# Patient Record
Sex: MALE | Race: ASIAN | NOT HISPANIC OR LATINO | URBAN - METROPOLITAN AREA
[De-identification: names, ages, dates, MRNs, and addresses within clinical notes are randomized per-mention and may not be internally consistent; named-entity substitution may affect disease eponyms.]

---

## 2017-06-07 PROBLEM — 59621000 ESSENTIAL HYPERTENSION: Status: ACTIVE | Noted: 2017-06-07

## 2017-06-07 PROBLEM — 34713006 VITAMIN D DEFICIENCY: Status: ACTIVE | Noted: 2017-06-07

## 2018-04-20 PROBLEM — 275978004 SCREENING FOR MALIGNANT NEOPLASM OF COLON: Status: ACTIVE | Noted: 2018-04-20

## 2018-05-14 PROBLEM — 92076000 BENIGN NEOPLASM OF DESCENDING COLON: Status: ACTIVE | Noted: 2018-05-08

## 2018-11-20 PROBLEM — 267434003 MIXED HYPERLIPIDEMIA: Status: ACTIVE | Noted: 2017-06-07

## 2019-11-18 PROBLEM — 16331000: Status: ACTIVE | Noted: 2019-05-28

## 2020-10-06 ENCOUNTER — OFFICE VISIT (OUTPATIENT)
Dept: URBAN - METROPOLITAN AREA CLINIC 37 | Facility: CLINIC | Age: 55
End: 2020-10-06

## 2020-10-12 ENCOUNTER — LAB OUTSIDE AN ENCOUNTER (OUTPATIENT)
Dept: URBAN - METROPOLITAN AREA CLINIC 37 | Facility: CLINIC | Age: 55
End: 2020-10-12

## 2020-10-13 ENCOUNTER — LAB OUTSIDE AN ENCOUNTER (OUTPATIENT)
Dept: URBAN - METROPOLITAN AREA CLINIC 37 | Facility: CLINIC | Age: 55
End: 2020-10-13

## 2020-10-17 LAB
AFP-L3: (no result)
AFP: 1.2
ALBUMIN/GLOBULIN RATIO: 1.8
ALBUMIN: 4.6
ALKALINE PHOSPHATASE: 72
ALT: 25
AST: 18
BILIRUBIN, TOTAL: 0.6
BUN/CREATININE RATIO: 11
CALCIUM: 9.4
CARBON DIOXIDE: 28
CHLORIDE: 104
CREATININE: 1.42
EGFR AFRICAN AMERICAN: 65
EGFR NON-AFR. AMERICAN: 56
GLOBULIN: 2.5
GLUCOSE: 106
HEPATITIS B SURFACE: (no result)
HEPATITIS B VIRUS DNA: <1
HEPATITIS B VIRUS DNA: <10
POTASSIUM: 4.5
PROTEIN, TOTAL: 7.1
SODIUM: 143
UREA NITROGEN (BUN): 16

## 2020-10-20 ENCOUNTER — OFFICE VISIT (OUTPATIENT)
Dept: URBAN - METROPOLITAN AREA CLINIC 37 | Facility: CLINIC | Age: 55
End: 2020-10-20

## 2020-11-04 ENCOUNTER — OFFICE VISIT (OUTPATIENT)
Dept: URBAN - METROPOLITAN AREA CLINIC 37 | Facility: CLINIC | Age: 55
End: 2020-11-04

## 2020-11-04 VITALS — HEIGHT: 66 IN

## 2020-11-04 RX ORDER — TENOFOVIR ALAFENAMIDE 25 MG/1
1 TABLET WITH FOOD TABLET ORAL ONCE A DAY
Qty: 30 TABLET | Refills: 5 | OUTPATIENT
Start: 2020-11-04

## 2020-11-04 RX ORDER — TENOFOVIR DISOPROXIL FUMARATE 300 MG/1
1 TABLET TABLET, COATED ORAL ONCE A DAY
Qty: 90 TABLET | Refills: 1 | Status: ACTIVE | COMMUNITY

## 2020-11-04 RX ORDER — TENOFOVIR DISOPROXIL FUMARATE 300 MG/1
1 TABLET TABLET, COATED ORAL ONCE A DAY
Qty: 90 | Refills: 1 | OUTPATIENT

## 2020-11-04 RX ORDER — OMEPRAZOLE 20 MG/1
1 CAPSULE BEFORE BREAKFAST CAPSULE, DELAYED RELEASE ORAL ONCE A DAY
Qty: 90 | Refills: 0 | OUTPATIENT

## 2020-11-04 NOTE — HPI-MIGRATED HPI
Interim investigations : Labs done on: ->  * 10/12/2020, see below * 04/13/2020, see below;   Interim investigations : Imaging studies: ->  * US Abd on 10/20/2020: pending  * US Abd on 04/14/2020: Fatty liver but no hepatic mass. No GS. Otherwise, normal US abdomen. * US Abd on 08/15/19: Moderate fatty liver. No GS. Otherwise, normal US abodmen. * US Abd on 02/21/2019: Fatty Liver. No HCC, No GS, CBD 0.44cm  * US Abd on 02/21/19: Fatty liver. Otherwise, normal US abdomen;   Hep- B : Last follow up OV was -> 6 months ago;   Hep- B : Response to therapy has been -> Good;   Hep- B : Patient currently has been on ->  mg qd ;   Hep- B : Patient denies/admits using supplemental herbal -> denies using supplemental herbal products;   Hep- B : Patient is here for -> routine follow up for chronic hepatitis B;   Hep- B : Patient denies any symptoms of ->  jaundice, ascites/abd. distension, peripheral edema/swelling in legs, hematemesis or melena, weight loss, change in stool color, shortness of breath, fever, fatigue, depression/anxiety, nausea/vomiting;   Hep- B : Diagnosis was made -> years ago on routine labs;   Hep- B : Patient has been using -> compliant w/antiviral regimen;   Hep- B : Family history of liver or GI malignancy -> denies;   Follow up OV : Patient is here for a routine OV for -> fatty liver and burning reflux; Last OV was 6 months ago; * Fatty liver:; Patient is not taking medication and clinically monitoring with labs; Patient was advised to exercise regularly. ; ; * Burning reflux:; Patient reported stopped taking Omeprazole 20mg since last OV;

## 2021-04-19 ENCOUNTER — LAB OUTSIDE AN ENCOUNTER (OUTPATIENT)
Dept: URBAN - METROPOLITAN AREA CLINIC 37 | Facility: CLINIC | Age: 56
End: 2021-04-19

## 2021-04-20 ENCOUNTER — LAB OUTSIDE AN ENCOUNTER (OUTPATIENT)
Dept: URBAN - METROPOLITAN AREA CLINIC 37 | Facility: CLINIC | Age: 56
End: 2021-04-20

## 2021-04-20 ENCOUNTER — OFFICE VISIT (OUTPATIENT)
Dept: URBAN - METROPOLITAN AREA CLINIC 37 | Facility: CLINIC | Age: 56
End: 2021-04-20

## 2021-04-20 RX ORDER — TENOFOVIR ALAFENAMIDE 25 MG/1
1 TABLET WITH FOOD TABLET ORAL ONCE A DAY
Qty: 30 TABLET | Refills: 5 | Status: ACTIVE | COMMUNITY
Start: 2020-11-04

## 2021-04-20 RX ORDER — TENOFOVIR DISOPROXIL FUMARATE 300 MG/1
1 TABLET TABLET, COATED ORAL ONCE A DAY
Qty: 90 | Refills: 1 | Status: ACTIVE | COMMUNITY

## 2021-04-23 LAB
AFP-L3: (no result)
AFP: 1
ALBUMIN/GLOBULIN RATIO: 1.9
ALBUMIN: 4.3
ALKALINE PHOSPHATASE: 54
ALT: 24
AST: 21
BILIRUBIN, TOTAL: 0.7
BUN/CREATININE RATIO: (no result)
CALCIUM: 9.2
CARBON DIOXIDE: 27
CHLORIDE: 105
CREATININE: 1.03
EGFR AFRICAN AMERICAN: 96
EGFR NON-AFR. AMERICAN: 83
GLOBULIN: 2.3
GLUCOSE: 107
HEPATITIS B VIRUS DNA: <1
HEPATITIS B VIRUS DNA: <10
HEPATITIS BE ANTIBODY: REACTIVE
POTASSIUM: 5.2
PROTEIN, TOTAL: 6.6
SODIUM: 139
UREA NITROGEN (BUN): 21

## 2021-05-10 ENCOUNTER — OFFICE VISIT (OUTPATIENT)
Dept: URBAN - METROPOLITAN AREA CLINIC 37 | Facility: CLINIC | Age: 56
End: 2021-05-10

## 2021-05-10 VITALS
TEMPERATURE: 97.5 F | HEIGHT: 66 IN | DIASTOLIC BLOOD PRESSURE: 84 MMHG | HEART RATE: 72 BPM | OXYGEN SATURATION: 99 % | WEIGHT: 180 LBS | BODY MASS INDEX: 28.93 KG/M2 | SYSTOLIC BLOOD PRESSURE: 119 MMHG

## 2021-05-10 RX ORDER — UBIDECARENONE/VIT E ACET 100MG-5
1 CAPSULE CAPSULE ORAL ONCE A DAY
Qty: 30 | Status: ACTIVE | COMMUNITY

## 2021-05-10 RX ORDER — TENOFOVIR DISOPROXIL FUMARATE 300 MG/1
1 TABLET TABLET, COATED ORAL ONCE A DAY
Qty: 90 | Refills: 1 | Status: ACTIVE | COMMUNITY

## 2021-05-10 RX ORDER — ACETAMINOPHEN 325 MG
1 TABLET AS NEEDED TABLET ORAL
Status: ACTIVE | COMMUNITY

## 2021-05-10 RX ORDER — TENOFOVIR ALAFENAMIDE 25 MG/1
1 TABLET WITH FOOD TABLET ORAL ONCE A DAY
Qty: 30 TABLET | Refills: 5 | Status: ON HOLD | COMMUNITY
Start: 2020-11-04

## 2021-05-10 RX ORDER — TENOFOVIR ALAFENAMIDE 25 MG/1
1 TABLET WITH FOOD TABLET ORAL ONCE A DAY
Qty: 30 TABLET | Refills: 5

## 2021-05-10 NOTE — HPI-MIGRATED HPI
Interim investigations : Labs done on: ->  * 04/20/2021, see below;   Interim investigations : Imaging studies: ->  * US Abd on 4/20/2021:  * US Abd on 10/20/2020: Fatty liver. No HCC. Gallstone was noted. Otherwise, normal US abdomen * US Abd on 04/14/2020: Fatty liver but no hepatic mass. No GS. Otherwise, normal US abdomen. * US Abd on 08/15/19: Moderate fatty liver. No GS. Otherwise, normal US abodmen. * US Abd on 02/21/2019: Fatty Liver. No HCC, No GS, CBD 0.44cm * US Abd on 02/21/19: Fatty liver. Otherwise, normal US abdomen;   Follow up OV : Patient is here for a routine OV for -> fatty liver and burning reflux; Last OV was 6 months ago; * Fatty liver:; Patient is not taking medication and clinically monitoring with labs; Patient was advised to exercise regularly. ; ; * Burning reflux:; Patient reported stopped taking Omeprazole 20mg;   Hep- B : Last follow up OV was -> 6 months ago;   Hep- B : Patient denies any symptoms of ->  jaundice, ascites/abd. distension, peripheral edema/swelling in legs, hematemesis or melena, weight loss, change in stool color, shortness of breath, fever, fatigue, depression/anxiety, nausea/vomiting;   Hep- B : Patient denies/admits using supplemental herbal -> denies using supplemental herbal products;   Hep- B : Patient currently has been on -> Vemlidy 25mg daily;   Hep- B : Response to therapy has been -> Good;   Hep- B : Patient is here for -> routine follow up for chronic hepatitis B;   Hep- B : Family history of liver or GI malignancy -> denies in 1st degree relative. However, his paternal grandfather had liver cancer;   Hep- B : Patient has been using -> compliant w/antiviral regimen;   Hep- B : Diagnosis was made -> years ago on routine labs;

## 2021-10-05 ENCOUNTER — OFFICE VISIT (OUTPATIENT)
Dept: URBAN - METROPOLITAN AREA CLINIC 37 | Facility: CLINIC | Age: 56
End: 2021-10-05

## 2021-10-19 ENCOUNTER — LAB OUTSIDE AN ENCOUNTER (OUTPATIENT)
Dept: URBAN - METROPOLITAN AREA CLINIC 37 | Facility: CLINIC | Age: 56
End: 2021-10-19

## 2021-10-20 ENCOUNTER — LAB OUTSIDE AN ENCOUNTER (OUTPATIENT)
Dept: URBAN - METROPOLITAN AREA CLINIC 37 | Facility: CLINIC | Age: 56
End: 2021-10-20

## 2021-10-23 LAB
ALBUMIN/GLOBULIN RATIO: 2
ALBUMIN: 4.7
ALKALINE PHOSPHATASE: 55
ALPHA FETOPROTEIN,: 1.4
ALT: 16
AST: 17
BILIRUBIN, TOTAL: 0.9
BUN/CREATININE RATIO: (no result)
CALCIUM: 10
CARBON DIOXIDE: 28
CHLORIDE: 103
CREATININE: 1.18
EGFR AFRICAN AMERICAN: 81
EGFR NON-AFR. AMERICAN: 70
GLOBULIN: 2.4
GLUCOSE: 103
HEPATITIS B SURFACE: (no result)
HEPATITIS B VIRUS DNA: <1
HEPATITIS B VIRUS DNA: <10
POTASSIUM: 4.7
PROTEIN, TOTAL: 7.1
SODIUM: 140
UREA NITROGEN (BUN): 15

## 2021-11-08 ENCOUNTER — OFFICE VISIT (OUTPATIENT)
Dept: URBAN - METROPOLITAN AREA CLINIC 37 | Facility: CLINIC | Age: 56
End: 2021-11-08

## 2021-11-08 VITALS
DIASTOLIC BLOOD PRESSURE: 80 MMHG | OXYGEN SATURATION: 96 % | HEIGHT: 66 IN | BODY MASS INDEX: 28.93 KG/M2 | WEIGHT: 180 LBS | HEART RATE: 70 BPM | SYSTOLIC BLOOD PRESSURE: 116 MMHG

## 2021-11-08 RX ORDER — TENOFOVIR ALAFENAMIDE 25 MG/1
1 TABLET WITH FOOD TABLET ORAL ONCE A DAY
Qty: 30 TABLET | Refills: 5 | Status: ACTIVE | COMMUNITY

## 2021-11-08 RX ORDER — UBIDECARENONE/VIT E ACET 100MG-5
1 CAPSULE CAPSULE ORAL ONCE A DAY
Qty: 30 | Status: ACTIVE | COMMUNITY

## 2021-11-08 RX ORDER — TENOFOVIR ALAFENAMIDE 25 MG/1
1 TABLET WITH FOOD TABLET ORAL ONCE A DAY
Qty: 30 TABLET | Refills: 5

## 2021-11-08 NOTE — HPI-MIGRATED HPI
Follow up OV : Patient is here for a routine OV for -> fatty liver and burning reflux Last OV was 6 months ago * Fatty liver: Patient is not taking medication and clinically monitoring with labs Patient was advised to exercise regularly;   Interim investigations : Labs done on: ->  * 10/20/2021; see below * 04/20/2021, see below;   Interim investigations : Imaging studies: ->  * US Abd on 10/05/2021: Fatty liver. No focal hepatic lesion. Stone in GB.  * US Abd on 4/20/2021: Fatty liver without focal hepatic lesion or mass. Stone in GB.  * US Abd on 10/20/2020: Fatty liver. No HCC. Gallstone was noted. Otherwise, normal US abdomen * US Abd on 04/14/2020: Fatty liver but no hepatic mass. No GS. Otherwise, normal US abdomen. * US Abd on 08/15/19: Moderate fatty liver. No GS. Otherwise, normal US abodmen. * US Abd on 02/21/2019: Fatty Liver. No HCC, No GS, CBD 0.44cm * US Abd on 02/21/19: Fatty liver. Otherwise, normal US abdomen;   Hep- B : Patient currently has been on -> Vemlidy 25mg daily, but reported that he did not received Vemily for about 1 month ago;   Hep- B : Response to therapy has been -> Good;   Hep- B : Family history of liver or GI malignancy -> denies in 1st degree relative. However, his paternal grandfather had liver cancer;   Hep- B : Patient has been using -> compliant w/antiviral regimen;   Hep- B : Diagnosis was made -> years ago on routine labs;   Hep- B : Patient denies/admits using supplemental herbal -> admits using supplemental herbal products for kidney;   Hep- B : Patient is here for -> routine follow up for chronic hepatitis B;   Hep- B : Last follow up OV was -> 6 months ago;   Hep- B : Patient denies any symptoms of ->  jaundice, ascites/abd. distension, peripheral edema/swelling in legs, hematemesis or melena, weight loss, change in stool color, shortness of breath, fever, fatigue, depression/anxiety, nausea/vomiting;

## 2022-03-16 ENCOUNTER — TELEPHONE ENCOUNTER (OUTPATIENT)
Dept: URBAN - METROPOLITAN AREA CLINIC 36 | Facility: CLINIC | Age: 57
End: 2022-03-16

## 2022-03-16 RX ORDER — UBIDECARENONE/VIT E ACET 100MG-5
1 CAPSULE CAPSULE ORAL ONCE A DAY
Qty: 30 | Status: ACTIVE | COMMUNITY

## 2022-03-16 RX ORDER — TENOFOVIR ALAFENAMIDE 25 MG/1
1 TABLET WITH FOOD TABLET ORAL ONCE A DAY
Qty: 30 TABLET | Refills: 2
Start: 2022-03-17 | End: 2022-06-15

## 2022-03-16 RX ORDER — TENOFOVIR ALAFENAMIDE 25 MG/1
1 TABLET WITH FOOD TABLET ORAL ONCE A DAY
Qty: 30 TABLET | Refills: 5 | Status: ACTIVE | COMMUNITY

## 2022-04-12 ENCOUNTER — LAB OUTSIDE AN ENCOUNTER (OUTPATIENT)
Dept: URBAN - METROPOLITAN AREA CLINIC 37 | Facility: CLINIC | Age: 57
End: 2022-04-12

## 2022-04-19 ENCOUNTER — LAB OUTSIDE AN ENCOUNTER (OUTPATIENT)
Dept: URBAN - METROPOLITAN AREA CLINIC 37 | Facility: CLINIC | Age: 57
End: 2022-04-19

## 2022-05-09 ENCOUNTER — OFFICE VISIT (OUTPATIENT)
Dept: URBAN - METROPOLITAN AREA CLINIC 37 | Facility: CLINIC | Age: 57
End: 2022-05-09
Payer: COMMERCIAL

## 2022-05-09 ENCOUNTER — LAB OUTSIDE AN ENCOUNTER (OUTPATIENT)
Dept: URBAN - METROPOLITAN AREA CLINIC 37 | Facility: CLINIC | Age: 57
End: 2022-05-09

## 2022-05-09 VITALS
WEIGHT: 187 LBS | HEIGHT: 66 IN | HEART RATE: 78 BPM | BODY MASS INDEX: 30.05 KG/M2 | SYSTOLIC BLOOD PRESSURE: 132 MMHG | OXYGEN SATURATION: 96 % | DIASTOLIC BLOOD PRESSURE: 88 MMHG

## 2022-05-09 DIAGNOSIS — K64.1 SECOND DEGREE HEMORRHOIDS: ICD-10-CM

## 2022-05-09 DIAGNOSIS — B18.1 CHRONIC VIRAL HEPATITIS B WITHOUT DELTA-AGENT: ICD-10-CM

## 2022-05-09 DIAGNOSIS — K82.4 CHOLESTEROLOSIS OF GALLBLADDER: ICD-10-CM

## 2022-05-09 DIAGNOSIS — Z86.010 PERSONAL HISTORY OF COLONIC POLYPS: ICD-10-CM

## 2022-05-09 DIAGNOSIS — K76.0 FATTY (CHANGE OF) LIVER, NOT ELSEWHERE CLASSIFIED: ICD-10-CM

## 2022-05-09 PROCEDURE — 99214 OFFICE O/P EST MOD 30 MIN: CPT | Performed by: NURSE PRACTITIONER

## 2022-05-09 RX ORDER — TENOFOVIR ALAFENAMIDE 25 MG/1
1 TABLET WITH FOOD TABLET ORAL ONCE A DAY
Qty: 30 TABLET | Refills: 5 | Status: ACTIVE | COMMUNITY

## 2022-05-09 RX ORDER — UBIDECARENONE/VIT E ACET 100MG-5
1 CAPSULE CAPSULE ORAL ONCE A DAY
Qty: 30 | Status: ACTIVE | COMMUNITY

## 2022-05-09 RX ORDER — TENOFOVIR ALAFENAMIDE 25 MG/1
1 TABLET WITH FOOD TABLET ORAL ONCE A DAY
Qty: 30 TABLET | Refills: 5

## 2022-05-11 LAB
A/G RATIO: 1.9
AFP, SERUM, TUMOR MARKER: 1.6
ALBUMIN: 4.6
ALKALINE PHOSPHATASE: 52
ALT (SGPT): 24
AST (SGOT): 22
BILIRUBIN, TOTAL: 0.8
BUN/CREATININE RATIO: (no result)
BUN: 17
CALCIUM: 9.7
CARBON DIOXIDE, TOTAL: 25
CHLORIDE: 106
CREATININE: 1.22
EGFR AFRICAN AMERICAN: 77
EGFR NON-AFR. AMERICAN: 67
GLOBULIN, TOTAL: 2.4
GLUCOSE: 106
HEP BE AB: REACTIVE
HEPATITIS B VIRUS DNA: <1
HEPATITIS B VIRUS DNA: <10
POTASSIUM: 5.1
PROTEIN, TOTAL: 7
SODIUM: 140

## 2022-05-16 ENCOUNTER — LAB OUTSIDE AN ENCOUNTER (OUTPATIENT)
Dept: URBAN - METROPOLITAN AREA CLINIC 37 | Facility: CLINIC | Age: 57
End: 2022-05-16

## 2022-05-24 ENCOUNTER — CLAIMS CREATED FROM THE CLAIM WINDOW (OUTPATIENT)
Dept: URBAN - METROPOLITAN AREA CLINIC 38 | Facility: CLINIC | Age: 57
End: 2022-05-24

## 2022-05-24 ENCOUNTER — OFFICE VISIT (OUTPATIENT)
Dept: URBAN - METROPOLITAN AREA CLINIC 38 | Facility: CLINIC | Age: 57
End: 2022-05-24

## 2022-05-24 ENCOUNTER — CLAIMS CREATED FROM THE CLAIM WINDOW (OUTPATIENT)
Dept: URBAN - METROPOLITAN AREA CLINIC 38 | Facility: CLINIC | Age: 57
End: 2022-05-24
Payer: COMMERCIAL

## 2022-05-24 DIAGNOSIS — K80.20 CALCULUS OF GALLBLADDER: ICD-10-CM

## 2022-05-24 DIAGNOSIS — K76.0 FATTY (CHANGE OF) LIVER: ICD-10-CM

## 2022-05-24 PROCEDURE — 76700 US EXAM ABDOM COMPLETE: CPT | Performed by: INTERNAL MEDICINE

## 2022-06-10 PROBLEM — 70342003 CHOLELITHIASIS WITHOUT OBSTRUCTION: Status: ACTIVE | Noted: 2022-06-10

## 2022-11-05 ENCOUNTER — TELEPHONE ENCOUNTER (OUTPATIENT)
Dept: URBAN - METROPOLITAN AREA CLINIC 37 | Facility: CLINIC | Age: 57
End: 2022-11-05

## 2022-11-05 PROBLEM — 721704005 SECOND DEGREE HEMORRHOIDS: Status: ACTIVE | Noted: 2018-05-08

## 2022-11-05 PROBLEM — 61565001 CHOLESTEROLOSIS OF GALLBLADDER: Status: ACTIVE | Noted: 2017-10-18

## 2022-11-07 ENCOUNTER — OFFICE VISIT (OUTPATIENT)
Dept: URBAN - METROPOLITAN AREA CLINIC 37 | Facility: CLINIC | Age: 57
End: 2022-11-07
Payer: COMMERCIAL

## 2022-11-07 ENCOUNTER — TELEPHONE ENCOUNTER (OUTPATIENT)
Dept: URBAN - METROPOLITAN AREA CLINIC 37 | Facility: CLINIC | Age: 57
End: 2022-11-07

## 2022-11-07 VITALS — BODY MASS INDEX: 28.61 KG/M2 | HEIGHT: 66 IN | WEIGHT: 178 LBS

## 2022-11-07 DIAGNOSIS — K82.4 CHOLESTEROLOSIS OF GALLBLADDER: ICD-10-CM

## 2022-11-07 DIAGNOSIS — B18.1 CHRONIC VIRAL HEPATITIS B WITHOUT DELTA-AGENT: ICD-10-CM

## 2022-11-07 DIAGNOSIS — Z86.010 PERSONAL HISTORY OF COLONIC POLYPS: ICD-10-CM

## 2022-11-07 DIAGNOSIS — K76.0 FATTY (CHANGE OF) LIVER, NOT ELSEWHERE CLASSIFIED: ICD-10-CM

## 2022-11-07 DIAGNOSIS — K64.1 SECOND DEGREE HEMORRHOIDS: ICD-10-CM

## 2022-11-07 PROCEDURE — 99214 OFFICE O/P EST MOD 30 MIN: CPT | Performed by: NURSE PRACTITIONER

## 2022-11-07 RX ORDER — TENOFOVIR ALAFENAMIDE 25 MG/1
1 TABLET WITH FOOD TABLET ORAL ONCE A DAY
Qty: 30 TABLET | Refills: 1

## 2022-11-07 RX ORDER — TENOFOVIR ALAFENAMIDE 25 MG/1
1 TABLET WITH FOOD TABLET ORAL ONCE A DAY
Qty: 30 TABLET | Refills: 5 | Status: ACTIVE | COMMUNITY

## 2022-11-09 ENCOUNTER — LAB OUTSIDE AN ENCOUNTER (OUTPATIENT)
Dept: URBAN - METROPOLITAN AREA CLINIC 37 | Facility: CLINIC | Age: 57
End: 2022-11-09

## 2022-11-10 LAB
A/G RATIO: 1.8
AFP, SERUM, TUMOR MARKER: 1.3
ALBUMIN: 4.6
ALKALINE PHOSPHATASE: 52
ALT (SGPT): 15
AST (SGOT): 15
BILIRUBIN, TOTAL: 0.9
BUN/CREATININE RATIO: (no result)
BUN: 21
CALCIUM: 9.4
CARBON DIOXIDE, TOTAL: 26
CHLORIDE: 104
CREATININE: 1.19
EGFR: 72
GLOBULIN, TOTAL: 2.6
GLUCOSE: 93
HEPATITIS B VIRUS DNA: (no result)
HEPATITIS B VIRUS DNA: (no result)
POTASSIUM: 4.9
PROTEIN, TOTAL: 7.2
SODIUM: 140

## 2022-11-14 ENCOUNTER — LAB OUTSIDE AN ENCOUNTER (OUTPATIENT)
Dept: URBAN - METROPOLITAN AREA CLINIC 37 | Facility: CLINIC | Age: 57
End: 2022-11-14

## 2022-12-27 ENCOUNTER — OFFICE VISIT (OUTPATIENT)
Dept: URBAN - METROPOLITAN AREA CLINIC 38 | Facility: CLINIC | Age: 57
End: 2022-12-27
Payer: COMMERCIAL

## 2022-12-27 DIAGNOSIS — K80.20 GALL BLADDER STONES: ICD-10-CM

## 2022-12-27 DIAGNOSIS — K76.0 FATTY (CHANGE OF) LIVER, NOT ELSEWHERE CLASSIFIED: ICD-10-CM

## 2022-12-27 PROCEDURE — 76700 US EXAM ABDOM COMPLETE: CPT | Performed by: INTERNAL MEDICINE

## 2023-01-13 PROBLEM — 70342003 CHOLELITHIASIS WITHOUT OBSTRUCTION: Status: ACTIVE | Noted: 2023-01-13

## 2023-02-13 ENCOUNTER — LAB OUTSIDE AN ENCOUNTER (OUTPATIENT)
Dept: URBAN - METROPOLITAN AREA CLINIC 37 | Facility: CLINIC | Age: 58
End: 2023-02-13

## 2023-03-06 ENCOUNTER — OFFICE VISIT (OUTPATIENT)
Dept: URBAN - METROPOLITAN AREA CLINIC 37 | Facility: CLINIC | Age: 58
End: 2023-03-06

## 2023-03-06 RX ORDER — TENOFOVIR ALAFENAMIDE 25 MG/1
1 TABLET WITH FOOD TABLET ORAL ONCE A DAY
Qty: 30 TABLET | Refills: 1

## 2023-03-08 PROBLEM — 428283002 HISTORY OF POLYP OF COLON (SITUATION): Status: ACTIVE | Noted: 2018-11-20

## 2023-03-08 PROBLEM — 197321007 FATTY LIVER: Status: ACTIVE | Noted: 2017-10-18

## 2023-03-14 ENCOUNTER — DASHBOARD ENCOUNTERS (OUTPATIENT)
Age: 58
End: 2023-03-14

## 2023-03-14 ENCOUNTER — LAB OUTSIDE AN ENCOUNTER (OUTPATIENT)
Dept: URBAN - METROPOLITAN AREA CLINIC 37 | Facility: CLINIC | Age: 58
End: 2023-03-14

## 2023-03-14 ENCOUNTER — OFFICE VISIT (OUTPATIENT)
Dept: URBAN - METROPOLITAN AREA CLINIC 37 | Facility: CLINIC | Age: 58
End: 2023-03-14
Payer: COMMERCIAL

## 2023-03-14 VITALS — HEIGHT: 66 IN | WEIGHT: 153 LBS | BODY MASS INDEX: 24.59 KG/M2

## 2023-03-14 DIAGNOSIS — B18.1 CHRONIC VIRAL HEPATITIS B WITHOUT DELTA-AGENT: ICD-10-CM

## 2023-03-14 DIAGNOSIS — K64.1 SECOND DEGREE HEMORRHOIDS: ICD-10-CM

## 2023-03-14 DIAGNOSIS — Z86.010 PERSONAL HISTORY OF COLONIC POLYPS: ICD-10-CM

## 2023-03-14 DIAGNOSIS — K76.0 FATTY (CHANGE OF) LIVER, NOT ELSEWHERE CLASSIFIED: ICD-10-CM

## 2023-03-14 DIAGNOSIS — K82.4 CHOLESTEROLOSIS OF GALLBLADDER: ICD-10-CM

## 2023-03-14 PROCEDURE — 99213 OFFICE O/P EST LOW 20 MIN: CPT | Performed by: NURSE PRACTITIONER

## 2023-03-19 LAB
A/G RATIO: 1.8
AFP, SERUM, TUMOR MARKER: 1.3
ALBUMIN: 4.2
ALKALINE PHOSPHATASE: 55
ALT (SGPT): 15
AST (SGOT): 11
BILIRUBIN, TOTAL: 0.9
BUN/CREATININE RATIO: (no result)
BUN: 18
CALCIUM: 9.3
CARBON DIOXIDE, TOTAL: 27
CHLORIDE: 103
CREATININE: 1.13
EGFR: 77
GLOBULIN, TOTAL: 2.4
GLUCOSE: 89
HEPATITIS B VIRUS DNA: (no result)
HEPATITIS B VIRUS DNA: (no result)
POTASSIUM: 5.6
PROTEIN, TOTAL: 6.6
SODIUM: 139

## 2023-03-21 ENCOUNTER — LAB OUTSIDE AN ENCOUNTER (OUTPATIENT)
Dept: URBAN - METROPOLITAN AREA CLINIC 37 | Facility: CLINIC | Age: 58
End: 2023-03-21

## 2023-04-17 ENCOUNTER — OFFICE VISIT (OUTPATIENT)
Dept: URBAN - METROPOLITAN AREA CLINIC 37 | Facility: CLINIC | Age: 58
End: 2023-04-17

## 2023-04-18 ENCOUNTER — OFFICE VISIT (OUTPATIENT)
Dept: URBAN - METROPOLITAN AREA CLINIC 37 | Facility: CLINIC | Age: 58
End: 2023-04-18

## 2023-04-25 ENCOUNTER — OFFICE VISIT (OUTPATIENT)
Dept: URBAN - METROPOLITAN AREA CLINIC 37 | Facility: CLINIC | Age: 58
End: 2023-04-25

## 2023-04-25 NOTE — HPI-HEP B
Patient is here for routine follow up for Hepatitis B.  Last OV was 1 months ago and patient is here to discuss about recent lab, see below

## 2023-05-08 ENCOUNTER — OFFICE VISIT (OUTPATIENT)
Dept: URBAN - METROPOLITAN AREA CLINIC 37 | Facility: CLINIC | Age: 58
End: 2023-05-08